# Patient Record
Sex: MALE | URBAN - METROPOLITAN AREA
[De-identification: names, ages, dates, MRNs, and addresses within clinical notes are randomized per-mention and may not be internally consistent; named-entity substitution may affect disease eponyms.]

---

## 2022-12-21 ENCOUNTER — OFFICE VISIT (OUTPATIENT)
Dept: URBAN - METROPOLITAN AREA CLINIC 28 | Facility: CLINIC | Age: 37
End: 2022-12-21
Payer: COMMERCIAL

## 2022-12-21 DIAGNOSIS — H52.223 REGULAR ASTIGMATISM, BILATERAL: ICD-10-CM

## 2022-12-21 DIAGNOSIS — H53.022 REFRACTIVE AMBLYOPIA, LEFT EYE: ICD-10-CM

## 2022-12-21 DIAGNOSIS — E11.9 TYPE 2 DIABETES MELLITUS WITHOUT COMPLICATIONS: Primary | ICD-10-CM

## 2022-12-21 DIAGNOSIS — H40.022 OPEN ANGLE WITH BORDERLINE FINDINGS, HIGH RISK, LEFT EYE: ICD-10-CM

## 2022-12-21 DIAGNOSIS — Z79.84 LONG TERM (CURRENT) USE OF ORAL ANTIDIABETIC DRUGS: ICD-10-CM

## 2022-12-21 PROCEDURE — 92004 COMPRE OPH EXAM NEW PT 1/>: CPT | Performed by: OPTOMETRIST

## 2022-12-21 ASSESSMENT — INTRAOCULAR PRESSURE
OD: 18
OS: 14

## 2022-12-21 ASSESSMENT — KERATOMETRY
OS: 49.38
OD: 43.00

## 2022-12-21 NOTE — IMPRESSION/PLAN
Impression: Type 2 diabetes mellitus without complications: M00.8. Plan: Educated on importance of blood glucose control as related to ocular health. No diabetic retinopathy or maculopathy present. Monitor with annual dilation.
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
## 2023-01-30 ENCOUNTER — OFFICE VISIT (OUTPATIENT)
Dept: URBAN - METROPOLITAN AREA CLINIC 28 | Facility: CLINIC | Age: 38
End: 2023-01-30
Payer: COMMERCIAL

## 2023-01-30 DIAGNOSIS — H40.022 OPEN ANGLE WITH BORDERLINE FINDINGS, HIGH RISK, LEFT EYE: Primary | ICD-10-CM

## 2023-01-30 PROCEDURE — 92083 EXTENDED VISUAL FIELD XM: CPT | Performed by: OPTOMETRIST

## 2023-01-30 PROCEDURE — 92014 COMPRE OPH EXAM EST PT 1/>: CPT | Performed by: OPTOMETRIST

## 2023-01-30 PROCEDURE — 76514 ECHO EXAM OF EYE THICKNESS: CPT | Performed by: OPTOMETRIST

## 2023-01-30 PROCEDURE — 92133 CPTRZD OPH DX IMG PST SGM ON: CPT | Performed by: OPTOMETRIST

## 2023-01-30 ASSESSMENT — INTRAOCULAR PRESSURE
OS: 13
OD: 13
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
## 2024-02-01 ENCOUNTER — OFFICE VISIT (OUTPATIENT)
Dept: URBAN - METROPOLITAN AREA CLINIC 28 | Facility: CLINIC | Age: 39
End: 2024-02-01
Payer: COMMERCIAL

## 2024-02-01 DIAGNOSIS — Z79.84 LONG TERM (CURRENT) USE OF ORAL ANTIDIABETIC DRUGS: ICD-10-CM

## 2024-02-01 DIAGNOSIS — H40.022 OPEN ANGLE WITH BORDERLINE FINDINGS, HIGH RISK, LEFT EYE: Primary | ICD-10-CM

## 2024-02-01 DIAGNOSIS — H18.613 KERATOCONUS, STABLE, BILATERAL: ICD-10-CM

## 2024-02-01 DIAGNOSIS — E11.9 TYPE 2 DIABETES MELLITUS WITHOUT COMPLICATIONS: ICD-10-CM

## 2024-02-01 PROCEDURE — 92133 CPTRZD OPH DX IMG PST SGM ON: CPT | Performed by: OPTOMETRIST

## 2024-02-01 PROCEDURE — 92014 COMPRE OPH EXAM EST PT 1/>: CPT | Performed by: OPTOMETRIST

## 2024-02-01 PROCEDURE — 92025 CPTRIZED CORNEAL TOPOGRAPHY: CPT | Performed by: OPTOMETRIST

## 2024-02-01 PROCEDURE — 92083 EXTENDED VISUAL FIELD XM: CPT | Performed by: OPTOMETRIST

## 2024-02-01 ASSESSMENT — INTRAOCULAR PRESSURE
OD: 17
OS: 17